# Patient Record
Sex: FEMALE | NOT HISPANIC OR LATINO
[De-identification: names, ages, dates, MRNs, and addresses within clinical notes are randomized per-mention and may not be internally consistent; named-entity substitution may affect disease eponyms.]

---

## 2019-11-19 ENCOUNTER — NON-APPOINTMENT (OUTPATIENT)
Age: 39
End: 2019-11-19

## 2019-11-19 ENCOUNTER — APPOINTMENT (OUTPATIENT)
Dept: HEART AND VASCULAR | Facility: CLINIC | Age: 39
End: 2019-11-19
Payer: COMMERCIAL

## 2019-11-19 VITALS
RESPIRATION RATE: 14 BRPM | WEIGHT: 142 LBS | DIASTOLIC BLOOD PRESSURE: 71 MMHG | HEIGHT: 61 IN | SYSTOLIC BLOOD PRESSURE: 103 MMHG | BODY MASS INDEX: 26.81 KG/M2 | OXYGEN SATURATION: 97 % | HEART RATE: 69 BPM

## 2019-11-19 PROCEDURE — 99205 OFFICE O/P NEW HI 60 MIN: CPT

## 2019-11-19 NOTE — ASSESSMENT
[FreeTextEntry1] : 40 yo F with PMH endometrial cancer  now in remission here for first visit with this provider for evaluation of chest pain.\par \par \par CHEST PAIN\par -description of symptoms is atypical for angina\par -in the setting of persistence of symptoms will order stress test to r/o for CAD\par -echo to r/o WMA\par \par PALPITATIONS\par -echo\par -please obtain CMP and TSH from PMD, if not available will repeat in office\par \par HTN\par -well controlled on no medications (the patient reports being borderline hypotensive at baseline) \par \par HLD\par -fasting lipid panel\par \par f/u in 2 weeks after stress test and echo \par \par \par \par

## 2019-11-19 NOTE — PHYSICAL EXAM
[General Appearance - Well Developed] : well developed [Normal Appearance] : normal appearance [Well Groomed] : well groomed [General Appearance - Well Nourished] : well nourished [No Deformities] : no deformities [Heart Rate And Rhythm] : heart rate and rhythm were normal [General Appearance - In No Acute Distress] : no acute distress [Murmurs] : no murmurs present [Heart Sounds] : normal S1 and S2 [Exaggerated Use Of Accessory Muscles For Inspiration] : no accessory muscle use [Respiration, Rhythm And Depth] : normal respiratory rhythm and effort [Auscultation Breath Sounds / Voice Sounds] : lungs were clear to auscultation bilaterally [Abdomen Tenderness] : non-tender [Abdomen Soft] : soft [Abdomen Mass (___ Cm)] : no abdominal mass palpated [Abnormal Walk] : normal gait [Nail Clubbing] : no clubbing of the fingernails [Gait - Sufficient For Exercise Testing] : the gait was sufficient for exercise testing [Cyanosis, Localized] : no localized cyanosis [Petechial Hemorrhages (___cm)] : no petechial hemorrhages [] : no ischemic changes [Oriented To Time, Place, And Person] : oriented to person, place, and time [Affect] : the affect was normal [Mood] : the mood was normal [No Anxiety] : not feeling anxious

## 2019-11-19 NOTE — HISTORY OF PRESENT ILLNESS
[FreeTextEntry1] : 40 yo F with PMH endometrial cancer  now in remission here for first visit with this provider for evaluation of chest pain. \par The patient reports episodes of chest pain and sob a/w palpitations mostly when feeling under stress at work \par Reports sensation of palpitations, and dizziness 3 months ago while at work for which she did not seek medical advice. Since then she experiences episodes of chest pressure mostly at rest, self resolving lasting few minutes. \par Denies syncope, presyncope.\par Unlimited exercise tolerance \par \par PMH \par as above\par \par PSH\par D&C\par \par ALL\par iodine\par cataflam\par \par MEDS\par seroquel 25 mg daily \par \par FH\par grandparents with MI and CABG\par mother HTN, HLD\par \par SH\par 1 ppd for 20 years, social etoh, no drugs\par \par

## 2019-12-12 ENCOUNTER — APPOINTMENT (OUTPATIENT)
Dept: HEART AND VASCULAR | Facility: CLINIC | Age: 39
End: 2019-12-12
Payer: COMMERCIAL

## 2019-12-12 PROCEDURE — 93306 TTE W/DOPPLER COMPLETE: CPT

## 2019-12-12 PROCEDURE — 93015 CV STRESS TEST SUPVJ I&R: CPT

## 2019-12-19 ENCOUNTER — APPOINTMENT (OUTPATIENT)
Dept: HEART AND VASCULAR | Facility: CLINIC | Age: 39
End: 2019-12-19

## 2020-01-28 ENCOUNTER — APPOINTMENT (OUTPATIENT)
Dept: HEART AND VASCULAR | Facility: CLINIC | Age: 40
End: 2020-01-28
Payer: COMMERCIAL

## 2020-01-28 VITALS
HEART RATE: 97 BPM | HEIGHT: 61 IN | RESPIRATION RATE: 14 BRPM | OXYGEN SATURATION: 97 % | DIASTOLIC BLOOD PRESSURE: 70 MMHG | SYSTOLIC BLOOD PRESSURE: 122 MMHG | WEIGHT: 142 LBS | BODY MASS INDEX: 26.81 KG/M2

## 2020-01-28 DIAGNOSIS — Z85.42 PERSONAL HISTORY OF MALIGNANT NEOPLASM OF OTHER PARTS OF UTERUS: ICD-10-CM

## 2020-01-28 PROCEDURE — 99215 OFFICE O/P EST HI 40 MIN: CPT

## 2020-01-28 NOTE — PHYSICAL EXAM
[General Appearance - Well Developed] : well developed [Normal Appearance] : normal appearance [Well Groomed] : well groomed [General Appearance - Well Nourished] : well nourished [No Deformities] : no deformities [General Appearance - In No Acute Distress] : no acute distress [Exaggerated Use Of Accessory Muscles For Inspiration] : no accessory muscle use [Auscultation Breath Sounds / Voice Sounds] : lungs were clear to auscultation bilaterally [Respiration, Rhythm And Depth] : normal respiratory rhythm and effort [Heart Rate And Rhythm] : heart rate and rhythm were normal [Heart Sounds] : normal S1 and S2 [Abdomen Tenderness] : non-tender [Abdomen Soft] : soft [Abdomen Mass (___ Cm)] : no abdominal mass palpated [Abnormal Walk] : normal gait [Gait - Sufficient For Exercise Testing] : the gait was sufficient for exercise testing [Cyanosis, Localized] : no localized cyanosis [Nail Clubbing] : no clubbing of the fingernails [Petechial Hemorrhages (___cm)] : no petechial hemorrhages [] : no ischemic changes [Oriented To Time, Place, And Person] : oriented to person, place, and time [No Anxiety] : not feeling anxious [Affect] : the affect was normal [Mood] : the mood was normal [FreeTextEntry1] : 1/6 systolic murmur  RLSB

## 2020-01-28 NOTE — HISTORY OF PRESENT ILLNESS
[FreeTextEntry1] : 40 yo F with PMH endometrial cancer  now in remission here for f/u with this provider for evaluation of chest pain. \par The patient reports episodes of chest pain and sob have resolved completely since last visit. \par Denies syncope, presyncope, palpitations. She reports unlimited exercise tolerance. Can walk 30 city blocks without symptoms. \par Currently undergoing stress at home due to family issues (divorce). Still smoking\par \par PMH \par as above\par \par PSH\par D&C\par \par ALL\par iodine\par diclofenac\par \par MEDS\par seroquel 25 mg daily \par \par FH\par grandparents with MI and CABG\par mother HTN, HLD\par \par SH\par 1 ppd for 20 years, social etoh, no drugs\par \par EKG 12/19/2019,  SR, wnl \par \par Echo: 12/12/2019\par LVEF 60 to 65%\par Normal left ventricular size and wall thickness, with normal systolic and diastolic function\par Structurally normal mitral valve, with normal leaflet excursion\par Normal trileaflet aortic valve with normal opening\par \par Exercise Stress Test: 12/12/2019\par Negative EKG Stress\par Catracho protocol, 9m 6s s reaching 88% of MPHR\par No CP, no arrhythmias

## 2020-01-28 NOTE — ASSESSMENT
[FreeTextEntry1] : 38 yo F with PMH endometrial cancer now in remission here for follow up evaluation.\par \par CHEST PAIN\par -resolved\par -normal stress test\par -normal baseline EKG and normal echo, unlimited exercise tolerance\par -recc to continue to monitor for symptoms and return to clinic if any recurrence\par \par PALPITATIONS\par -resolved\par -echo WNL\par -no labs available for review: the patient reports nomal TSH and CMP with her PMD (obtain records) \par -recc to continue to monitor for symptoms and return to clinic if any recurrence\par \par HTN\par -well controlled on no medications (the patient reports being borderline hypotensive at baseline) \par \par f/u in 6 month or sooner if any new symptoms\par \par \par \par

## 2020-07-28 ENCOUNTER — APPOINTMENT (OUTPATIENT)
Dept: HEART AND VASCULAR | Facility: CLINIC | Age: 40
End: 2020-07-28

## 2021-10-28 ENCOUNTER — APPOINTMENT (OUTPATIENT)
Dept: HEART AND VASCULAR | Facility: CLINIC | Age: 41
End: 2021-10-28
Payer: COMMERCIAL

## 2021-10-28 ENCOUNTER — NON-APPOINTMENT (OUTPATIENT)
Age: 41
End: 2021-10-28

## 2021-10-28 VITALS
RESPIRATION RATE: 15 BRPM | SYSTOLIC BLOOD PRESSURE: 122 MMHG | WEIGHT: 153 LBS | DIASTOLIC BLOOD PRESSURE: 85 MMHG | BODY MASS INDEX: 28.89 KG/M2 | TEMPERATURE: 97.1 F | HEART RATE: 90 BPM | HEIGHT: 61 IN | OXYGEN SATURATION: 96 %

## 2021-10-28 PROCEDURE — 99214 OFFICE O/P EST MOD 30 MIN: CPT

## 2021-10-28 NOTE — PHYSICAL EXAM
[General Appearance - Well Developed] : well developed [Normal Appearance] : normal appearance [Well Groomed] : well groomed [General Appearance - Well Nourished] : well nourished [No Deformities] : no deformities [General Appearance - In No Acute Distress] : no acute distress [Respiration, Rhythm And Depth] : normal respiratory rhythm and effort [Exaggerated Use Of Accessory Muscles For Inspiration] : no accessory muscle use [Auscultation Breath Sounds / Voice Sounds] : lungs were clear to auscultation bilaterally [Heart Rate And Rhythm] : heart rate and rhythm were normal [Heart Sounds] : normal S1 and S2 [Abdomen Soft] : soft [Abdomen Tenderness] : non-tender [Abdomen Mass (___ Cm)] : no abdominal mass palpated [Abnormal Walk] : normal gait [Gait - Sufficient For Exercise Testing] : the gait was sufficient for exercise testing [Nail Clubbing] : no clubbing of the fingernails [Cyanosis, Localized] : no localized cyanosis [Petechial Hemorrhages (___cm)] : no petechial hemorrhages [] : no ischemic changes [Oriented To Time, Place, And Person] : oriented to person, place, and time [Affect] : the affect was normal [Mood] : the mood was normal [No Anxiety] : not feeling anxious [Well Developed] : well developed [Well Nourished] : well nourished [No Acute Distress] : no acute distress [Normal Conjunctiva] : normal conjunctiva [Normal Venous Pressure] : normal venous pressure [No Carotid Bruit] : no carotid bruit [Normal S1, S2] : normal S1, S2 [No Murmur] : no murmur [No Rub] : no rub [No Gallop] : no gallop [Clear Lung Fields] : clear lung fields [Good Air Entry] : good air entry [No Respiratory Distress] : no respiratory distress  [Soft] : abdomen soft [Non Tender] : non-tender [No Masses/organomegaly] : no masses/organomegaly [Normal Bowel Sounds] : normal bowel sounds [Normal Gait] : normal gait [No Edema] : no edema [No Cyanosis] : no cyanosis [No Clubbing] : no clubbing [No Varicosities] : no varicosities [No Rash] : no rash [No Skin Lesions] : no skin lesions [Moves all extremities] : moves all extremities [No Focal Deficits] : no focal deficits [Normal Speech] : normal speech [Alert and Oriented] : alert and oriented [Normal memory] : normal memory [FreeTextEntry1] : 1/6 systolic murmur  RLSB

## 2021-10-28 NOTE — HISTORY OF PRESENT ILLNESS
[FreeTextEntry1] : Dr Alves [phone]\par \par 42 yo F with PMH endometrial cancer  now in remission here for f/u with this provider . Last visit 1/2020\par Under stress, same as last year, now enrolled in nursing school\par Reports episode of acute shortness of breath associated with diaphoresis chest pain and palpitations for dinner few days ago setting of been very nervous.  Symptoms self resolved\par Reports of being very anxious.\par Denies chest pain, shortness of breath, palpitations, syncope, presyncope, LE edema, orthopnea. \par Reports that she had labs few months ago by her primary MD.\par Smoking 1 ppd\par can walk five blocks, dyspnea after walking up stairs, has not been exercising\par \par \par PMH \par as above\par \par PSH\par D&C\par \par ALL\par iodine\par diclofenac\par \par MEDS\par seroquel 25 mg daily \par \par FH\par grandparents with MI and CABG\par mother HTN, HLD\par \par SH\par 1 ppd for 20 years, social etoh, no drugs\par \par EKG 12/19/2019,  SR, wnl \par EKG 10/28/2021: Sinus rhythm, within normal limits\par \par Echo: 12/12/2019\par LVEF 60 to 65%\par Normal left ventricular size and wall thickness, with normal systolic and diastolic function\par Structurally normal mitral valve, with normal leaflet excursion\par Normal trileaflet aortic valve with normal opening\par \par Exercise Stress Test: 12/12/2019\par Negative EKG Stress\par Catracho protocol, 9m 6s s reaching 88% of MPHR\par No CP, no arrhythmias

## 2021-10-28 NOTE — ASSESSMENT
[FreeTextEntry1] : 40 yo F with PMH endometrial cancer now in remission here for follow up evaluation.\par \par CHEST PAIN\par -Atypical\par -normal stress test 12/2019\par -normal baseline EKG SR, today\par -Repeat echocardiogram today to rule out any wall motion abnormalities\par -recc to continue to monitor for symptoms and return to clinic if any recurrence\par \par PALPITATIONS\par -Likely anxiety related\par -We will repeat echocardiogram today\par -Labs from PMD occluding TSH and CMP\par -recc to continue to monitor for symptoms and return to clinic if any recurrence\par \par HTN\par -well controlled on no medications (the patient reports being borderline hypotensive at baseline) \par \par f/u post echo via telehealth\par \par \par \par

## 2021-12-02 ENCOUNTER — APPOINTMENT (OUTPATIENT)
Dept: HEART AND VASCULAR | Facility: CLINIC | Age: 41
End: 2021-12-02
Payer: COMMERCIAL

## 2021-12-02 PROCEDURE — 93306 TTE W/DOPPLER COMPLETE: CPT

## 2021-12-16 ENCOUNTER — APPOINTMENT (OUTPATIENT)
Dept: HEART AND VASCULAR | Facility: CLINIC | Age: 41
End: 2021-12-16
Payer: COMMERCIAL

## 2021-12-16 ENCOUNTER — APPOINTMENT (OUTPATIENT)
Dept: INTERNAL MEDICINE | Facility: CLINIC | Age: 41
End: 2021-12-16
Payer: COMMERCIAL

## 2021-12-16 VITALS
TEMPERATURE: 98.9 F | HEIGHT: 61 IN | DIASTOLIC BLOOD PRESSURE: 79 MMHG | BODY MASS INDEX: 28.89 KG/M2 | WEIGHT: 153 LBS | OXYGEN SATURATION: 98 % | SYSTOLIC BLOOD PRESSURE: 115 MMHG | HEART RATE: 84 BPM | RESPIRATION RATE: 16 BRPM

## 2021-12-16 VITALS
BODY MASS INDEX: 28.89 KG/M2 | WEIGHT: 153 LBS | DIASTOLIC BLOOD PRESSURE: 79 MMHG | OXYGEN SATURATION: 97 % | TEMPERATURE: 98.3 F | SYSTOLIC BLOOD PRESSURE: 134 MMHG | HEART RATE: 97 BPM | HEIGHT: 61 IN

## 2021-12-16 DIAGNOSIS — Z72.89 OTHER PROBLEMS RELATED TO LIFESTYLE: ICD-10-CM

## 2021-12-16 DIAGNOSIS — J32.9 CHRONIC SINUSITIS, UNSPECIFIED: ICD-10-CM

## 2021-12-16 DIAGNOSIS — Z80.3 FAMILY HISTORY OF MALIGNANT NEOPLASM OF BREAST: ICD-10-CM

## 2021-12-16 DIAGNOSIS — F17.210 NICOTINE DEPENDENCE, CIGARETTES, UNCOMPLICATED: ICD-10-CM

## 2021-12-16 DIAGNOSIS — Z87.898 PERSONAL HISTORY OF OTHER SPECIFIED CONDITIONS: ICD-10-CM

## 2021-12-16 DIAGNOSIS — R00.2 PALPITATIONS: ICD-10-CM

## 2021-12-16 DIAGNOSIS — C54.1 MALIGNANT NEOPLASM OF ENDOMETRIUM: ICD-10-CM

## 2021-12-16 DIAGNOSIS — Z00.00 ENCOUNTER FOR GENERAL ADULT MEDICAL EXAMINATION W/OUT ABNORMAL FINDINGS: ICD-10-CM

## 2021-12-16 DIAGNOSIS — Z82.49 FAMILY HISTORY OF ISCHEMIC HEART DISEASE AND OTHER DISEASES OF THE CIRCULATORY SYSTEM: ICD-10-CM

## 2021-12-16 DIAGNOSIS — Z83.438 FAMILY HISTORY OF OTHER DISORDER OF LIPOPROTEIN METABOLISM AND OTHER LIPIDEMIA: ICD-10-CM

## 2021-12-16 DIAGNOSIS — Z80.42 FAMILY HISTORY OF MALIGNANT NEOPLASM OF PROSTATE: ICD-10-CM

## 2021-12-16 DIAGNOSIS — R06.00 DYSPNEA, UNSPECIFIED: ICD-10-CM

## 2021-12-16 DIAGNOSIS — R07.9 CHEST PAIN, UNSPECIFIED: ICD-10-CM

## 2021-12-16 DIAGNOSIS — M54.9 DORSALGIA, UNSPECIFIED: ICD-10-CM

## 2021-12-16 DIAGNOSIS — R10.9 UNSPECIFIED ABDOMINAL PAIN: ICD-10-CM

## 2021-12-16 PROCEDURE — 99203 OFFICE O/P NEW LOW 30 MIN: CPT | Mod: 25

## 2021-12-16 PROCEDURE — 99214 OFFICE O/P EST MOD 30 MIN: CPT

## 2021-12-16 PROCEDURE — 36415 COLL VENOUS BLD VENIPUNCTURE: CPT

## 2021-12-16 RX ORDER — AZITHROMYCIN 250 MG/1
250 TABLET, FILM COATED ORAL
Qty: 1 | Refills: 0 | Status: ACTIVE | COMMUNITY
Start: 2021-12-16 | End: 1900-01-01

## 2021-12-16 RX ORDER — PANTOPRAZOLE 40 MG/1
40 TABLET, DELAYED RELEASE ORAL DAILY
Qty: 30 | Refills: 3 | Status: ACTIVE | COMMUNITY
Start: 2021-12-16 | End: 1900-01-01

## 2021-12-16 RX ORDER — EPINEPHRINE 0.3 MG/.3ML
0.3 INJECTION INTRAMUSCULAR
Qty: 1 | Refills: 3 | Status: ACTIVE | COMMUNITY
Start: 2021-12-16 | End: 1900-01-01

## 2021-12-16 NOTE — HISTORY OF PRESENT ILLNESS
[FreeTextEntry1] : Dr Alves [phone]\par \par 40 yo F with PMH endometrial cancer  now in remission here for f/u obtain echo results. \par Reports improvement of her shortness of breath.\par Reports being very anxious.\par Denies chest pain, shortness of breath, palpitations, syncope, presyncope, LE edema, orthopnea. \par Smoking 1 ppd\par She has good exercise tolerance\par \par \par PMH \par as above\par \par PSH\par D&C\par \par ALL\par iodine\par diclofenac\par \par MEDS\par seroquel 25 mg daily \par \par FH\par grandparents with MI and CABG\par mother HTN, HLD\par \par SH\par 1 ppd for 20 years, social etoh, no drugs\par \par EKG 12/19/2019,  SR, wnl \par EKG 10/28/2021: Sinus rhythm, within normal limits\par \par Echo: 12/12/2019\par LVEF 60 to 65%\par Normal left ventricular size and wall thickness, with normal systolic and diastolic function\par Structurally normal mitral valve, with normal leaflet excursion\par Normal trileaflet aortic valve with normal opening\par \par Exercise Stress Test: 12/12/2019\par Negative EKG Stress\par Catracho protocol, 9m 6s s reaching 88% of MPHR\par No CP, no arrhythmias\par \par Echocardiogram 12/3/2021\par LVEF of 60 to 65%\par Normal right ventricular size and function\par Structurally normal mitral valve\par PAPS 19 mmHg\par \par \par Labs 6/27/2021\par Cholesterol 166\par Triglycerides 175\par LDL 96\par HDL 35\par

## 2021-12-16 NOTE — PHYSICAL EXAM
[Well Developed] : well developed [Well Nourished] : well nourished [No Acute Distress] : no acute distress [Normal Conjunctiva] : normal conjunctiva [Normal Venous Pressure] : normal venous pressure [No Carotid Bruit] : no carotid bruit [Normal S1, S2] : normal S1, S2 [No Murmur] : no murmur [No Rub] : no rub [No Gallop] : no gallop [Clear Lung Fields] : clear lung fields [Good Air Entry] : good air entry [No Respiratory Distress] : no respiratory distress  [Soft] : abdomen soft [Non Tender] : non-tender [No Masses/organomegaly] : no masses/organomegaly [Normal Bowel Sounds] : normal bowel sounds [Normal Gait] : normal gait [No Edema] : no edema [No Cyanosis] : no cyanosis [No Clubbing] : no clubbing [No Varicosities] : no varicosities [No Rash] : no rash [No Skin Lesions] : no skin lesions [Moves all extremities] : moves all extremities [No Focal Deficits] : no focal deficits [Normal Speech] : normal speech [Alert and Oriented] : alert and oriented [Normal memory] : normal memory [General Appearance - Well Developed] : well developed [Normal Appearance] : normal appearance [Well Groomed] : well groomed [General Appearance - Well Nourished] : well nourished [No Deformities] : no deformities [General Appearance - In No Acute Distress] : no acute distress [Respiration, Rhythm And Depth] : normal respiratory rhythm and effort [Exaggerated Use Of Accessory Muscles For Inspiration] : no accessory muscle use [Auscultation Breath Sounds / Voice Sounds] : lungs were clear to auscultation bilaterally [Heart Rate And Rhythm] : heart rate and rhythm were normal [Heart Sounds] : normal S1 and S2 [Abdomen Soft] : soft [Abdomen Tenderness] : non-tender [Abdomen Mass (___ Cm)] : no abdominal mass palpated [Abnormal Walk] : normal gait [Gait - Sufficient For Exercise Testing] : the gait was sufficient for exercise testing [Nail Clubbing] : no clubbing of the fingernails [Cyanosis, Localized] : no localized cyanosis [Petechial Hemorrhages (___cm)] : no petechial hemorrhages [] : no ischemic changes [Oriented To Time, Place, And Person] : oriented to person, place, and time [Affect] : the affect was normal [Mood] : the mood was normal [No Anxiety] : not feeling anxious [FreeTextEntry1] : 1/6 systolic murmur  RLSB

## 2021-12-16 NOTE — ASSESSMENT
[FreeTextEntry1] : 40 yo F with PMH endometrial cancer now in remission here for follow up evaluation.\par \par CHEST PAIN\par -Resolved\par -Atypical\par -normal stress test 12/2019\par -Normal echocardiogram 12/2021\par -Baseline EKG 10/28/2021 SR\par -recc to continue to monitor for symptoms and return to clinic if any recurrence\par \par PALPITATIONS\par -Resolved\par -Likely anxiety related\par -Echocardiogram within normal limits at 12/2021\par -recc to continue to monitor for symptoms and return to clinic if any recurrence\par \par HTN\par -well controlled on no medications (the patient reports being borderline hypotensive at baseline) \par \par Follow-up in 1 year or sooner if any symptoms\par \par \par \par

## 2021-12-17 LAB
25(OH)D3 SERPL-MCNC: 24.2 NG/ML
ALBUMIN SERPL ELPH-MCNC: 4.8 G/DL
ALP BLD-CCNC: 94 U/L
ALT SERPL-CCNC: 19 U/L
ANION GAP SERPL CALC-SCNC: 12 MMOL/L
APPEARANCE: CLEAR
AST SERPL-CCNC: 19 U/L
BACTERIA: NEGATIVE
BASOPHILS # BLD AUTO: 0.06 K/UL
BASOPHILS NFR BLD AUTO: 0.6 %
BILIRUB SERPL-MCNC: 0.3 MG/DL
BILIRUBIN URINE: NEGATIVE
BLOOD URINE: NEGATIVE
BUN SERPL-MCNC: 8 MG/DL
CALCIUM SERPL-MCNC: 9.8 MG/DL
CHLORIDE SERPL-SCNC: 103 MMOL/L
CHOLEST SERPL-MCNC: 208 MG/DL
CO2 SERPL-SCNC: 24 MMOL/L
COLOR: COLORLESS
CREAT SERPL-MCNC: 0.73 MG/DL
EOSINOPHIL # BLD AUTO: 0.36 K/UL
EOSINOPHIL NFR BLD AUTO: 3.7 %
GLUCOSE QUALITATIVE U: NEGATIVE
GLUCOSE SERPL-MCNC: 80 MG/DL
HCT VFR BLD CALC: 43.2 %
HDLC SERPL-MCNC: 47 MG/DL
HGB BLD-MCNC: 13.8 G/DL
HYALINE CASTS: 0 /LPF
IMM GRANULOCYTES NFR BLD AUTO: 0.4 %
KETONES URINE: NEGATIVE
LDLC SERPL CALC-MCNC: 131 MG/DL
LEUKOCYTE ESTERASE URINE: NEGATIVE
LYMPHOCYTES # BLD AUTO: 3.26 K/UL
LYMPHOCYTES NFR BLD AUTO: 33.3 %
MAN DIFF?: NORMAL
MCHC RBC-ENTMCNC: 30.3 PG
MCHC RBC-ENTMCNC: 31.9 GM/DL
MCV RBC AUTO: 94.9 FL
MICROSCOPIC-UA: NORMAL
MONOCYTES # BLD AUTO: 0.67 K/UL
MONOCYTES NFR BLD AUTO: 6.8 %
NEUTROPHILS # BLD AUTO: 5.41 K/UL
NEUTROPHILS NFR BLD AUTO: 55.2 %
NITRITE URINE: NEGATIVE
NONHDLC SERPL-MCNC: 161 MG/DL
PH URINE: 7.5
PLATELET # BLD AUTO: 271 K/UL
POTASSIUM SERPL-SCNC: 4.5 MMOL/L
PROT SERPL-MCNC: 7.2 G/DL
PROTEIN URINE: NEGATIVE
RBC # BLD: 4.55 M/UL
RBC # FLD: 13.6 %
RED BLOOD CELLS URINE: 1 /HPF
SODIUM SERPL-SCNC: 139 MMOL/L
SPECIFIC GRAVITY URINE: 1
SQUAMOUS EPITHELIAL CELLS: 1 /HPF
T4 FREE SERPL-MCNC: 1.3 NG/DL
TRIGL SERPL-MCNC: 147 MG/DL
TSH SERPL-ACNC: 0.91 UIU/ML
UROBILINOGEN URINE: NORMAL
VIT B12 SERPL-MCNC: 520 PG/ML
WBC # FLD AUTO: 9.8 K/UL
WHITE BLOOD CELLS URINE: 0 /HPF

## 2021-12-19 PROBLEM — Z00.00 ENCOUNTER FOR PREVENTIVE HEALTH EXAMINATION: Status: ACTIVE | Noted: 2019-11-18

## 2021-12-19 PROBLEM — R00.2 PALPITATIONS: Status: ACTIVE | Noted: 2019-11-19

## 2021-12-19 PROBLEM — Z80.3 FAMILY HISTORY OF MALIGNANT NEOPLASM OF BREAST: Status: ACTIVE | Noted: 2021-12-17

## 2021-12-19 PROBLEM — Z82.49 FAMILY HISTORY OF CORONARY ARTERY DISEASE: Status: ACTIVE | Noted: 2021-12-17

## 2021-12-19 PROBLEM — Z83.438 FAMILY HISTORY OF HYPERLIPIDEMIA: Status: ACTIVE | Noted: 2021-12-17

## 2021-12-19 PROBLEM — Z80.42 FAMILY HISTORY OF MALIGNANT NEOPLASM OF PROSTATE: Status: ACTIVE | Noted: 2021-12-17

## 2021-12-19 PROBLEM — Z87.898 HISTORY OF INSOMNIA: Status: RESOLVED | Noted: 2021-12-17 | Resolved: 2021-12-19

## 2021-12-19 PROBLEM — R07.9 CHEST PAIN: Status: ACTIVE | Noted: 2019-11-19

## 2021-12-19 PROBLEM — F17.210 SMOKES CIGARETTES: Status: ACTIVE | Noted: 2021-12-17

## 2021-12-19 PROBLEM — R06.00 DYSPNEA ON EXERTION: Status: ACTIVE | Noted: 2021-10-28

## 2021-12-19 PROBLEM — Z72.89 ALCOHOL USE: Status: ACTIVE | Noted: 2021-12-17

## 2021-12-19 NOTE — HEALTH RISK ASSESSMENT
[Good] : ~his/her~  mood as  good [Current] : Current [Yes] : Yes [2 - 3 times a week (3 pts)] : 2 - 3  times a week (3 points) [1 or 2 (0 pts)] : 1 or 2 (0 points) [Never (0 pts)] : Never (0 points) [No] : In the past 12 months have you used drugs other than those required for medical reasons? No [No falls in past year] : Patient reported no falls in the past year [0] : 2) Feeling down, depressed, or hopeless: Not at all (0) [de-identified] : 1 PPD [de-identified] : 1 beer/ night [Audit-CScore] : 1 [de-identified] : lightly active [de-identified] : regular [[id number]] : 0 [MammogramDate] : 11/2021

## 2021-12-19 NOTE — REVIEW OF SYSTEMS
[Headache] : headache [Negative] : Heme/Lymph [FreeTextEntry4] : pressure sinuses; clogged ears [FreeTextEntry7] : epigastric pain

## 2021-12-19 NOTE — HISTORY OF PRESENT ILLNESS
[FreeTextEntry1] : evaluation  [de-identified] : NGUYEN RODRIGUEZ is a 41 year old female who presents today for evaluation. Pt is post COVID-19vaccine booster shot 1 weeks ago (Pfizer). She relates she had a headache the same day as well as pressure sinuses that got better. However, she now has clogged ears. \par \par Pt also relates epigastric pain. She was given Pantoprazole 20  mg QD. Three days later she developed pain of the back and she stopped it.

## 2021-12-19 NOTE — END OF VISIT
[FreeTextEntry3] : I, Zenaida Hernandez, personally transcribed these services in the presence of Dr. Sean Crowder MD. I, Dr. Sean Crowder MD, personally performed the services described in this documentation as scribed by Zenaida Hernandez in my presence and it is both accurate and complete.

## 2021-12-19 NOTE — PLAN
[FreeTextEntry1] : Z-Gabo as directed\par \par Pantoprazole 20 mg PO QD\par \par X-ray L-S spine \par \par Abdominal and Pelvis CT scan \par \par Blood tests and urine sent to the lab\par \par Epipenas directed